# Patient Record
Sex: MALE | Race: WHITE | Employment: UNEMPLOYED | URBAN - METROPOLITAN AREA
[De-identification: names, ages, dates, MRNs, and addresses within clinical notes are randomized per-mention and may not be internally consistent; named-entity substitution may affect disease eponyms.]

---

## 2023-05-30 ENCOUNTER — APPOINTMENT (OUTPATIENT)
Facility: HOSPITAL | Age: 48
End: 2023-05-30

## 2023-05-30 ENCOUNTER — HOSPITAL ENCOUNTER (EMERGENCY)
Facility: HOSPITAL | Age: 48
Discharge: HOME | End: 2023-05-30
Attending: EMERGENCY MEDICINE

## 2023-05-30 VITALS
DIASTOLIC BLOOD PRESSURE: 62 MMHG | OXYGEN SATURATION: 96 % | TEMPERATURE: 99.6 F | SYSTOLIC BLOOD PRESSURE: 142 MMHG | RESPIRATION RATE: 18 BRPM | HEIGHT: 72 IN | HEART RATE: 76 BPM | WEIGHT: 270 LBS | BODY MASS INDEX: 36.57 KG/M2

## 2023-05-30 DIAGNOSIS — R60.9 SWELLING: ICD-10-CM

## 2023-05-30 DIAGNOSIS — M10.9 ACUTE GOUT OF RIGHT ELBOW, UNSPECIFIED CAUSE: Primary | ICD-10-CM

## 2023-05-30 LAB — ECHO BSA: 2.49 M2

## 2023-05-30 PROCEDURE — 99284 EMERGENCY DEPT VISIT MOD MDM: CPT

## 2023-05-30 PROCEDURE — 73080 X-RAY EXAM OF ELBOW: CPT

## 2023-05-30 PROCEDURE — 6370000000 HC RX 637 (ALT 250 FOR IP): Performed by: EMERGENCY MEDICINE

## 2023-05-30 PROCEDURE — 93971 EXTREMITY STUDY: CPT

## 2023-05-30 RX ORDER — METHYLPREDNISOLONE 4 MG/1
TABLET ORAL
Qty: 1 KIT | Refills: 0 | Status: SHIPPED | OUTPATIENT
Start: 2023-05-30 | End: 2023-06-05

## 2023-05-30 RX ORDER — IBUPROFEN 600 MG/1
600 TABLET ORAL EVERY 6 HOURS PRN
Qty: 30 TABLET | Refills: 0 | Status: SHIPPED | OUTPATIENT
Start: 2023-05-30

## 2023-05-30 RX ORDER — COLCHICINE 0.6 MG/1
0.6 TABLET ORAL
Status: DISCONTINUED | OUTPATIENT
Start: 2023-05-30 | End: 2023-05-30 | Stop reason: HOSPADM

## 2023-05-30 RX ORDER — COLCHICINE 0.6 MG/1
1.2 TABLET ORAL ONCE
Status: COMPLETED | OUTPATIENT
Start: 2023-05-30 | End: 2023-05-30

## 2023-05-30 RX ADMIN — COLCHICINE 1.2 MG: 0.6 TABLET, FILM COATED ORAL at 17:41

## 2023-05-30 NOTE — ED TRIAGE NOTES
Pt presents with R arm swollen and red x2 days, extends from elbows to fingers, pt has good pulses upon arrival to room

## 2023-05-30 NOTE — ED NOTES
Care assumed from Dr. Keena Moore, 39-year-old male presenting complaints of right arm pain, swelling, negative Doppler study and plain films, exam consistent with gouty arthritis, discharged with treatment, to follow with primary care, return precautions given.      Charmayne Leader, MD  05/30/23 1958

## 2023-05-30 NOTE — DISCHARGE INSTRUCTIONS
Thank you for allowing us to provide you with medical care today. We realize that you have many choices for your emergency care needs. We thank you for choosing [facility]. Please choose us in the future for any continued health care needs. The exam and treatment you received in the Emergency Department were for an emergent problem and are not intended as complete care. It is important that you follow up with a doctor, nurse practitioner, or physician's assistant for ongoing care. If your symptoms worsen or you do not improve as expected and you are unable to reach your usual health care provider, you should return to the Emergency Department. We are available 24 hours a day. Please make an appointment with your health care provider(s) for follow up of your Emergency Department visit. Take this sheet with you when you go to your follow-up visit.

## 2023-05-30 NOTE — ED NOTES
Spoke with Vero Vallecillo who stated US machine problems right now, will let me know when machine is fixed. MD Divya Morales aware.       Deborah Polanco RN  05/30/23 [id number]

## 2023-05-30 NOTE — ED PROVIDER NOTES
[facility] EMERGENCY DEPT  EMERGENCY DEPARTMENT ENCOUNTER      Patient Name: Betty Ramon  MRN: [id number]  [place] 1975  Date of Evaluation: 5/30/2023  Physician: Jose Henry MD    CHIEF COMPLAINT       Chief Complaint   Patient presents with    Arm Pain       HISTORY OF PRESENT ILLNESS   (Location/Symptom, Timing/Onset, Context/Setting, Quality, Duration, Modifying Factors, Severity)   [place] Gloria Gr, 50 y.o., male     25-year-old male with a history of gout presents with right elbow pain. Patient reports that the elbow started hurting several days ago. He is also noticed swelling in his right arm. He denies trauma. Nursing Notes were reviewed. REVIEW OF SYSTEMS    (Not required)   Review of Systems   Musculoskeletal:  Positive for arthralgias. PAST MEDICAL HISTORY   History reviewed. No pertinent past medical history. SURGICAL HISTORY     History reviewed. No pertinent surgical history. CURRENT MEDICATIONS       Previous Medications    No medications on file       ALLERGIES     Patient has no known allergies. FAMILY HISTORY     History reviewed. No pertinent family history. SOCIAL HISTORY       Social History     Tobacco Use    Smoking status: Every Day     Types: Cigarettes    Smokeless tobacco: Never   Substance and Sexual Activity    Alcohol use: Yes       PHYSICAL EXAM     Vitals:    Vitals:    05/30/23 1723 05/30/23 1745   BP: 139/79 (!) 143/73   Pulse: 76    Resp: 18    Temp: 99.6 °F (37.6 °C)    TempSrc: Oral    SpO2: 100% 94%   Weight: 122.5 kg (270 lb)    Height: 1.829 m (6')        Physical Exam  Vitals and nursing note reviewed. Constitutional:       General: He is not in acute distress. Appearance: Normal appearance. He is not ill-appearing, toxic-appearing or diaphoretic. HENT:      Head: Normocephalic and atraumatic. Mouth/Throat:      Mouth: Mucous membranes are moist.   Eyes:      Extraocular Movements: Extraocular movements intact.    Cardiovascular:

## 2023-05-31 NOTE — ED NOTES
Pt now states that he wants to the bus station. Original trip to the [address] [place] [address] Road station canceled and rebooked to the [facility] in [place].      Zakiya Goodman RN  05/30/23 2026

## 2023-05-31 NOTE — ED NOTES
Patient discharge instructions reviewed with patient. Patient medications, signs/symptoms to return, and follow up information were all reviewed with the patient. Patient expressed understanding of information being taught. All patient questions were answered prior to discharge. Patient ambulatory upon discharge. Patient given phone number to unit in case of further questions once returning home.         Glo Solis RN  05/30/23 2012

## 2023-07-18 ENCOUNTER — HOSPITAL ENCOUNTER (EMERGENCY)
Facility: HOSPITAL | Age: 48
Discharge: HOME | End: 2023-07-18

## 2023-07-18 VITALS
BODY MASS INDEX: 35.21 KG/M2 | DIASTOLIC BLOOD PRESSURE: 92 MMHG | TEMPERATURE: 97.8 F | OXYGEN SATURATION: 99 % | HEIGHT: 72 IN | RESPIRATION RATE: 18 BRPM | WEIGHT: 260 LBS | SYSTOLIC BLOOD PRESSURE: 141 MMHG | HEART RATE: 71 BPM

## 2023-07-18 DIAGNOSIS — M54.9 OTHER CHRONIC BACK PAIN: ICD-10-CM

## 2023-07-18 DIAGNOSIS — Z78.9 NEEDS ASSISTANCE WITH COMMUNITY RESOURCES: Primary | ICD-10-CM

## 2023-07-18 DIAGNOSIS — G89.29 OTHER CHRONIC BACK PAIN: ICD-10-CM

## 2023-07-18 PROCEDURE — 99283 EMERGENCY DEPT VISIT LOW MDM: CPT

## 2023-07-18 PROCEDURE — 6370000000 HC RX 637 (ALT 250 FOR IP)

## 2023-07-18 RX ORDER — NAPROXEN 500 MG/1
500 TABLET ORAL
Status: COMPLETED | OUTPATIENT
Start: 2023-07-18 | End: 2023-07-18

## 2023-07-18 RX ORDER — NAPROXEN 500 MG/1
500 TABLET ORAL 2 TIMES DAILY WITH MEALS
Qty: 60 TABLET | Refills: 1 | Status: SHIPPED | OUTPATIENT
Start: 2023-07-18

## 2023-07-18 RX ADMIN — NAPROXEN 500 MG: 500 TABLET ORAL at 14:14

## 2023-07-18 ASSESSMENT — PAIN DESCRIPTION - LOCATION: LOCATION: FOOT

## 2023-07-18 ASSESSMENT — PAIN SCALES - GENERAL: PAINLEVEL_OUTOF10: 8

## 2023-07-18 NOTE — ED PROVIDER NOTES
Counseling  Discussed the risks of smoking tobacco products and the long term sequelae of tobacco use with the patient such as increased risk of heart attack, stroke, peripheral artery disease and cancer. The patient verbalized their understanding and were provided resources if asked. Counseled patient for approximately 3 minutes. PROCEDURES   Unless otherwise noted above, none. Procedures      CRITICAL CARE TIME   Patient does not meet Critical Care Time, 0 minutes    FINAL IMPRESSION     1. Needs assistance with community resources    2. Other chronic back pain          DISPOSITION/PLAN   DISPOSITION Decision To Discharge 07/18/2023 02:10:19 PM    Discharge Note: The patient is stable for discharge home. The signs, symptoms, diagnosis, and discharge instructions have been discussed, understanding conveyed, and agreed upon. The patient is to follow up as recommended or return to ER should their symptoms worsen.       PATIENT REFERRED TO:  [facility] and [address] [place] [address]  [phone]  In 2 days          DISCHARGE MEDICATIONS:     Medication List        START taking these medications      naproxen 500 MG tablet  Commonly known as: NAPROSYN  Take 1 tablet by mouth 2 times daily (with meals)            ASK your doctor about these medications      ibuprofen 600 MG tablet  Commonly known as: IBU  Take 1 tablet by mouth every 6 hours as needed for Pain               Where to Get Your Medications        [facility] [phone] [address], [address] [place] [id number]      Phone: [phone]   naproxen 500 MG tablet           DISCONTINUED MEDICATIONS:  Current Discharge Medication List          I am the Primary Clinician of Record: Jose Daniel Rodriguez PA-C (electronically signed)    (Please note that parts of this dictation were completed with voice recognition

## 2023-07-18 NOTE — ED TRIAGE NOTES
Patient is here to see case management for resource, currently homeless, does not have anymore options at this time. Has a psych hx, no SI, no HI today. No medical complaints at this time.

## 2023-07-18 NOTE — CARE COORDINATION
7/18/23 CM informed by NP that pt was homeless & needed resources. [employer] given to pt with shelter , clinics, & [facility] information.

## 2023-09-13 ENCOUNTER — HOSPITAL ENCOUNTER (EMERGENCY)
Facility: HOSPITAL | Age: 48
Discharge: HOME | End: 2023-09-13
Attending: EMERGENCY MEDICINE

## 2023-09-13 ENCOUNTER — APPOINTMENT (OUTPATIENT)
Facility: HOSPITAL | Age: 48
End: 2023-09-13

## 2023-09-13 VITALS
RESPIRATION RATE: 16 BRPM | TEMPERATURE: 98.8 F | SYSTOLIC BLOOD PRESSURE: 137 MMHG | HEART RATE: 98 BPM | WEIGHT: 230 LBS | DIASTOLIC BLOOD PRESSURE: 83 MMHG | HEIGHT: 73 IN | BODY MASS INDEX: 30.48 KG/M2 | OXYGEN SATURATION: 96 %

## 2023-09-13 DIAGNOSIS — M79.89 FOOT SWELLING: Primary | ICD-10-CM

## 2023-09-13 DIAGNOSIS — Z59.00 HOMELESSNESS: ICD-10-CM

## 2023-09-13 DIAGNOSIS — S90.829A BLISTER OF FOOT WITHOUT INFECTION, INITIAL ENCOUNTER: ICD-10-CM

## 2023-09-13 LAB — ECHO BSA: 2.32 M2

## 2023-09-13 PROCEDURE — 99284 EMERGENCY DEPT VISIT MOD MDM: CPT

## 2023-09-13 PROCEDURE — 93971 EXTREMITY STUDY: CPT

## 2023-09-13 SDOH — ECONOMIC STABILITY - HOUSING INSECURITY: HOMELESSNESS UNSPECIFIED: Z59.00

## 2023-09-13 ASSESSMENT — ENCOUNTER SYMPTOMS
CHEST TIGHTNESS: 0
RHINORRHEA: 0
COUGH: 0
CONSTIPATION: 0
ABDOMINAL PAIN: 0
BACK PAIN: 0
SORE THROAT: 0
NAUSEA: 0
SHORTNESS OF BREATH: 0
VOMITING: 0
DIARRHEA: 0
WHEEZING: 0

## 2023-09-13 ASSESSMENT — LIFESTYLE VARIABLES
HOW MANY STANDARD DRINKS CONTAINING ALCOHOL DO YOU HAVE ON A TYPICAL DAY: 1 OR 2
HOW OFTEN DO YOU HAVE A DRINK CONTAINING ALCOHOL: MONTHLY OR LESS

## 2023-09-13 ASSESSMENT — PAIN - FUNCTIONAL ASSESSMENT: PAIN_FUNCTIONAL_ASSESSMENT: 0-10

## 2023-09-13 ASSESSMENT — PAIN SCALES - GENERAL: PAINLEVEL_OUTOF10: 8

## 2023-09-13 NOTE — ED TRIAGE NOTES
Pt in ED with c/o sores and swelling to bilateral feet. Pt is homeless and was wearing shoes that rubbed the inside of both feet couple days ago.

## 2023-09-13 NOTE — ED NOTES
Pt ambulated by self to bathroom and took a shower and was provided with clothes and toiletries.       Liseth santos RN  09/13/23 [id number]

## 2023-09-13 NOTE — ED PROVIDER NOTES
[facility] EMERGENCY DEPT  EMERGENCY DEPARTMENT ENCOUNTER      Pt Name: Gaspar Sever  MRN: [id number]  [address] [facility] 1975  Date of evaluation: 9/13/2023  Provider: Matthew Cavanaugh PA-C    CHIEF COMPLAINT       Chief Complaint   Patient presents with    Foot Injury         HISTORY OF PRESENT ILLNESS   (Location/Symptom, Timing/Onset, Context/Setting, Quality, Duration, Modifying Factors, Severity)  Note limiting factors. 42-year-old male with no significant past medical history presents with complaint of bilateral foot swelling and sores. Patient is homeless and states that his feet were rubbing on the inside of his shoes for the last few days. Also endorses some swelling and pain in his right calf. Patient denies chest pain, shortness of breath, fever, or any other symptoms at this time. Presents to the emergency department without any shoes. Review of External Medical Records:     Nursing Notes were reviewed. REVIEW OF SYSTEMS    (2-9 systems for level 4, 10 or more for level 5)     Review of Systems   Constitutional:  Negative for appetite change, chills and fever. HENT:  Negative for congestion, ear pain, rhinorrhea and sore throat. Respiratory:  Negative for cough, chest tightness, shortness of breath and wheezing. Cardiovascular:  Negative for chest pain and palpitations. Gastrointestinal:  Negative for abdominal pain, constipation, diarrhea, nausea and vomiting. Genitourinary:  Negative for decreased urine volume, difficulty urinating, dysuria, frequency, hematuria and urgency. Musculoskeletal:  Positive for joint swelling and myalgias. Negative for back pain. Skin:  Positive for wound. Negative for pallor and rash. Neurological:  Negative for dizziness, syncope, weakness, light-headedness, numbness and headaches. All other systems reviewed and are negative. Except as noted above the remainder of the review of systems was reviewed and negative.        PAST MEDICAL